# Patient Record
Sex: FEMALE | Race: OTHER | HISPANIC OR LATINO | Employment: UNEMPLOYED | URBAN - METROPOLITAN AREA
[De-identification: names, ages, dates, MRNs, and addresses within clinical notes are randomized per-mention and may not be internally consistent; named-entity substitution may affect disease eponyms.]

---

## 2024-08-08 ENCOUNTER — OFFICE VISIT (OUTPATIENT)
Dept: CARDIOLOGY | Facility: CLINIC | Age: 34
End: 2024-08-08

## 2024-08-08 VITALS
BODY MASS INDEX: 25.27 KG/M2 | SYSTOLIC BLOOD PRESSURE: 132 MMHG | HEIGHT: 67 IN | DIASTOLIC BLOOD PRESSURE: 80 MMHG | OXYGEN SATURATION: 99 % | HEART RATE: 64 BPM | WEIGHT: 161 LBS | RESPIRATION RATE: 18 BRPM

## 2024-08-08 DIAGNOSIS — R00.2 PALPITATIONS: ICD-10-CM

## 2024-08-08 DIAGNOSIS — I49.9 IRREGULAR HEART BEAT: Primary | ICD-10-CM

## 2024-08-08 PROCEDURE — 93005 ELECTROCARDIOGRAM TRACING: CPT | Mod: PBBFAC | Performed by: INTERNAL MEDICINE

## 2024-08-08 PROCEDURE — 99999 PR PBB SHADOW E&M-NEW PATIENT-LVL IV: CPT | Mod: PBBFAC,,, | Performed by: INTERNAL MEDICINE

## 2024-08-08 PROCEDURE — 99204 OFFICE O/P NEW MOD 45 MIN: CPT | Mod: PBBFAC | Performed by: INTERNAL MEDICINE

## 2024-08-09 LAB
OHS QRS DURATION: 94 MS
OHS QTC CALCULATION: 452 MS

## 2024-08-16 ENCOUNTER — HOSPITAL ENCOUNTER (OUTPATIENT)
Dept: PULMONOLOGY | Facility: HOSPITAL | Age: 34
Discharge: HOME | End: 2024-08-16
Attending: INTERNAL MEDICINE

## 2024-08-16 DIAGNOSIS — I49.9 IRREGULAR HEART BEAT: ICD-10-CM

## 2024-08-16 PROCEDURE — 93226 XTRNL ECG REC<48 HR SCAN A/R: CPT

## 2024-08-16 PROCEDURE — 93227 XTRNL ECG REC<48 HR R&I: CPT | Mod: ,,, | Performed by: INTERNAL MEDICINE

## 2024-08-22 ENCOUNTER — TELEPHONE (OUTPATIENT)
Dept: CARDIOLOGY | Facility: HOSPITAL | Age: 34
End: 2024-08-22

## 2024-08-22 LAB
OHS CV EVENT MONITOR DAY: 0
OHS CV HOLTER LENGTH DECIMAL HOURS: 48
OHS CV HOLTER LENGTH HOURS: 48
OHS CV HOLTER LENGTH MINUTES: 0
OHS CV HOLTER SINUS AVERAGE HR: 73
OHS CV HOLTER SINUS MAX HR: 138
OHS CV HOLTER SINUS MIN HR: 39

## 2024-08-22 NOTE — TELEPHONE ENCOUNTER
She was given reports of Holter over the telephone.  She is still feeling nocturnal palpitations, I gave reassurance and did not recommend medication.  Her echo showed normal ejection fraction.      She will make an appointment if she has further questions or have a friend call me for more information.

## 2025-05-07 ENCOUNTER — HOSPITAL ENCOUNTER (EMERGENCY)
Facility: HOSPITAL | Age: 35
Discharge: HOME | End: 2025-05-07
Attending: EMERGENCY MEDICINE

## 2025-05-07 VITALS
BODY MASS INDEX: 25.11 KG/M2 | WEIGHT: 160 LBS | RESPIRATION RATE: 18 BRPM | SYSTOLIC BLOOD PRESSURE: 119 MMHG | HEIGHT: 67 IN | DIASTOLIC BLOOD PRESSURE: 64 MMHG | HEART RATE: 69 BPM | OXYGEN SATURATION: 100 % | TEMPERATURE: 98 F

## 2025-05-07 DIAGNOSIS — Z75.8 DOES NOT HAVE PRIMARY CARE PROVIDER: ICD-10-CM

## 2025-05-07 DIAGNOSIS — B96.89 BACTERIAL VAGINOSIS: Primary | ICD-10-CM

## 2025-05-07 DIAGNOSIS — N76.0 BACTERIAL VAGINOSIS: Primary | ICD-10-CM

## 2025-05-07 LAB
ABSOLUTE EOSINOPHIL (OHS): 0.12 K/UL
ABSOLUTE MONOCYTE (OHS): 0.56 K/UL (ref 0.3–1)
ABSOLUTE NEUTROPHIL COUNT (OHS): 3.11 K/UL (ref 1.8–7.7)
ALBUMIN SERPL BCP-MCNC: 4.3 G/DL (ref 3.5–5.2)
ALP SERPL-CCNC: 82 UNIT/L (ref 38–126)
ALT SERPL W/O P-5'-P-CCNC: 17 UNIT/L (ref 10–44)
ANION GAP (OHS): 9 MMOL/L (ref 8–16)
AST SERPL-CCNC: 24 UNIT/L (ref 15–46)
B-HCG UR QL: NEGATIVE
BACTERIA GENITAL QL WET PREP: ABNORMAL
BASOPHILS # BLD AUTO: 0.04 K/UL
BASOPHILS NFR BLD AUTO: 0.6 %
BILIRUB SERPL-MCNC: 0.3 MG/DL (ref 0.1–1)
BILIRUB UR QL STRIP.AUTO: NEGATIVE
BUN SERPL-MCNC: 18 MG/DL (ref 7–17)
CALCIUM SERPL-MCNC: 9 MG/DL (ref 8.7–10.5)
CHLORIDE SERPL-SCNC: 102 MMOL/L (ref 95–110)
CLARITY UR: ABNORMAL
CLUE CELLS VAG QL WET PREP: ABNORMAL
CO2 SERPL-SCNC: 28 MMOL/L (ref 23–29)
COLOR UR AUTO: YELLOW
CREAT SERPL-MCNC: 0.7 MG/DL (ref 0.5–1.4)
CTP QC/QA: YES
ERYTHROCYTE [DISTWIDTH] IN BLOOD BY AUTOMATED COUNT: 12 % (ref 11.5–14.5)
FILAMENT FUNGI VAG WET PREP-#/AREA: ABNORMAL
GFR SERPLBLD CREATININE-BSD FMLA CKD-EPI: >60 ML/MIN/1.73/M2
GLUCOSE SERPL-MCNC: 81 MG/DL (ref 70–110)
GLUCOSE UR QL STRIP: NEGATIVE
HCT VFR BLD AUTO: 38.9 % (ref 37–48.5)
HGB BLD-MCNC: 13.3 GM/DL (ref 12–16)
HGB UR QL STRIP: NEGATIVE
HOLD SPECIMEN: NORMAL
IMM GRANULOCYTES # BLD AUTO: 0.02 K/UL (ref 0–0.04)
IMM GRANULOCYTES NFR BLD AUTO: 0.3 % (ref 0–0.5)
KETONES UR QL STRIP: NEGATIVE
LEUKOCYTE ESTERASE UR QL STRIP: NEGATIVE
LIPASE SERPL-CCNC: 83 U/L (ref 23–300)
LYMPHOCYTES # BLD AUTO: 3.05 K/UL (ref 1–4.8)
MCH RBC QN AUTO: 32.8 PG (ref 27–31)
MCHC RBC AUTO-ENTMCNC: 34.2 G/DL (ref 32–36)
MCV RBC AUTO: 96 FL (ref 82–98)
NITRITE UR QL STRIP: NEGATIVE
NUCLEATED RBC (/100WBC) (OHS): 0 /100 WBC
PH UR STRIP: 6 [PH]
PLATELET # BLD AUTO: 234 K/UL (ref 150–450)
PMV BLD AUTO: 10.8 FL (ref 9.2–12.9)
POTASSIUM SERPL-SCNC: 3.6 MMOL/L (ref 3.5–5.1)
PROT SERPL-MCNC: 7.6 GM/DL (ref 6–8.4)
PROT UR QL STRIP: NEGATIVE
RBC # BLD AUTO: 4.05 M/UL (ref 4–5.4)
RELATIVE EOSINOPHIL (OHS): 1.7 %
RELATIVE LYMPHOCYTE (OHS): 44.2 % (ref 18–48)
RELATIVE MONOCYTE (OHS): 8.1 % (ref 4–15)
RELATIVE NEUTROPHIL (OHS): 45.1 % (ref 38–73)
SODIUM SERPL-SCNC: 139 MMOL/L (ref 136–145)
SP GR UR STRIP: <=1.005
T VAGINALIS GENITAL QL WET PREP: ABNORMAL
UROBILINOGEN UR STRIP-ACNC: NEGATIVE EU/DL
WBC # BLD AUTO: 6.9 K/UL (ref 3.9–12.7)
WBC #/AREA VAG WET PREP: ABNORMAL
YEAST GENITAL QL WET PREP: ABNORMAL

## 2025-05-07 PROCEDURE — 83690 ASSAY OF LIPASE: CPT | Mod: ER

## 2025-05-07 PROCEDURE — 84075 ASSAY ALKALINE PHOSPHATASE: CPT | Mod: ER

## 2025-05-07 PROCEDURE — 25500020 PHARM REV CODE 255: Mod: ER

## 2025-05-07 PROCEDURE — 81003 URINALYSIS AUTO W/O SCOPE: CPT | Mod: ER

## 2025-05-07 PROCEDURE — 87210 SMEAR WET MOUNT SALINE/INK: CPT | Mod: ER

## 2025-05-07 PROCEDURE — 85025 COMPLETE CBC W/AUTO DIFF WBC: CPT | Mod: ER

## 2025-05-07 PROCEDURE — 81025 URINE PREGNANCY TEST: CPT | Mod: ER

## 2025-05-07 PROCEDURE — 99285 EMERGENCY DEPT VISIT HI MDM: CPT | Mod: 25,ER

## 2025-05-07 RX ORDER — METRONIDAZOLE 500 MG/1
500 TABLET ORAL EVERY 12 HOURS
Qty: 14 TABLET | Refills: 0 | Status: SHIPPED | OUTPATIENT
Start: 2025-05-07 | End: 2025-05-07

## 2025-05-07 RX ORDER — METRONIDAZOLE 500 MG/1
500 TABLET ORAL EVERY 12 HOURS
Qty: 14 TABLET | Refills: 0 | Status: SHIPPED | OUTPATIENT
Start: 2025-05-07 | End: 2025-05-14

## 2025-05-07 RX ADMIN — IOHEXOL 100 ML: 350 INJECTION, SOLUTION INTRAVENOUS at 03:05

## 2025-05-07 NOTE — DISCHARGE INSTRUCTIONS
Thank you for letting me care for you today - it was nice to meet you and I hope you feel better soon. I have placed a referral to [facility] for Primary Care. You can call [phone] to schedule.     I sent in the following medication to your pharmacy:   Metronidazole (flagyl): 1 tablet every 12 hours for 7 days. Please note that this medication can give you a metallic taste in your mouth. Also, DO NOT drink alcohol while taking this medication.      Our goal at [facility] is to always give you outstanding care and exceptional service. You may receive a survey by mail or email in the next week about your experience in our ED. We would greatly appreciate you completing and returning the survey. Your feedback provides us with a way to recognize our staff who give very good care and it helps us learn how to improve when your experience was below our aspiration of excellence.     All the best,     Yasmine Soot, MPH, PA-C  Emergency Department Physician Assistant  [facility], [facility], [facility]

## 2025-05-10 NOTE — ED PROVIDER NOTES
"Encounter Date: 5/7/2025       History     Chief Complaint   Patient presents with    Abdominal Pain     Left side ABD and back pain that started this morning and has become more severe.      Patient is a 34 y.o. female who presents to the ED for evaluation of left lower abdominal pain X several months. States that the pain became worse over the last few days. Patient says she has seen by primary care yesterday who recommended patient have a "scan", which patient has not been able to schedule. Says that the pain got worse this morning so she presents to the ED for evaluation.     Patient has taken no medication for symptom relief. Patient denies any previous abdominal surgeries.     Patient denies fever, chills. Patient denies nausea, vomiting. States his last bowel movement was yesterday, and patient reports it was normal. Patient  denies diarrhea, constipation. Patient denies BRBPR, denies melena, or changes in bowel habits. Patient  denies dysuria, difficulty urinating, hematuria, flank pain. Endorses slight increase in vaginal discharge, denies concern about STDs. Denies chest pain, shortness of breath, rashes, or any other complaints at this time.      The history is provided by the patient. The history is limited by a language barrier. A  was used ([place]  used for all patient interactions).     Review of patient's allergies indicates:  No Known Allergies  Past Medical History:   Diagnosis Date    Hypothyroidism      History reviewed. No pertinent surgical history.  No family history on file.  Social History[1]  Review of Systems   Constitutional:  Negative for fever.   HENT:  Negative for sore throat.    Respiratory:  Negative for shortness of breath.    Cardiovascular:  Negative for chest pain.   Gastrointestinal:  Positive for abdominal pain. Negative for abdominal distention, anal bleeding, blood in stool, constipation, diarrhea, nausea, rectal pain and vomiting. "   Genitourinary:  Positive for vaginal discharge. Negative for difficulty urinating, dysuria, flank pain, frequency, hematuria, pelvic pain, urgency and vaginal bleeding.   Musculoskeletal:  Negative for back pain.   Skin:  Negative for rash.   Neurological:  Negative for weakness.   Hematological:  Does not bruise/bleed easily.       Physical Exam     Initial Vitals [05/07/25 1331]   BP Pulse Resp Temp SpO2   120/75 71 18 98.2 °F (36.8 °C) 100 %      MAP       --         Physical Exam    Constitutional: She appears well-developed and well-nourished.   HENT:   Head: Normocephalic and atraumatic.   Cardiovascular:  Normal rate.           Abdominal: Abdomen is soft. Bowel sounds are normal. She exhibits no distension and no mass. There is abdominal tenderness. There is no rebound and no guarding.     Neurological: She is alert.         ED Course   Procedures  Labs Reviewed   WET PREP, GENITAL - Abnormal       Result Value    WBC None      Bacteria Moderate (*)     Clue Cells Few (*)     TRICHOMONAS  None      BUDDING YEAST None      FUNGAL HYPHAE None     COMPREHENSIVE METABOLIC PANEL - Abnormal    Sodium 139      Potassium 3.6      Chloride 102      CO2 28      Glucose 81      BUN 18 (*)     Creatinine 0.7      Calcium 9.0      Protein Total 7.6      Albumin 4.3      Bilirubin Total 0.3      ALP 82      AST 24      ALT 17      Anion Gap 9      eGFR >60     URINALYSIS, REFLEX TO URINE CULTURE - Abnormal    Color, UA Yellow      Appearance, UA Hazy (*)     pH, UA 6.0      Spec Grav UA <=1.005 (*)     Protein, UA Negative      Glucose, UA Negative      Ketones, UA Negative      Bilirubin, UA Negative      Blood, UA Negative      Nitrites, UA Negative      Urobilinogen, UA Negative      Leukocyte Esterase, UA Negative     CBC WITH DIFFERENTIAL - Abnormal    WBC 6.90      RBC 4.05      HGB 13.3      HCT 38.9      MCV 96      MCH 32.8 (*)     MCHC 34.2      RDW 12.0      Platelet Count 234      MPV 10.8      Nucleated RBC  0      Neut % 45.1      Lymph % 44.2      Mono % 8.1      Eos % 1.7      Basophil % 0.6      Imm Grans % 0.3      Neut # 3.11      Lymph # 3.05      Mono # 0.56      Eos # 0.12      Baso # 0.04      Imm Grans # 0.02     LIPASE - Normal    Lipase Level 83     CBC W/ AUTO DIFFERENTIAL    Narrative:     The following orders were created for panel order CBC auto differential.  Procedure                               Abnormality         Status                     ---------                               -----------         ------                     CBC with Differential[[id number]]         Abnormal            Final result                 Please view results for these tests on the individual orders.   GREY TOP URINE HOLD    Extra Tube Hold for add-ons.     POCT URINE PREGNANCY    POC Preg Test, Ur Negative       Acceptable Yes            Imaging Results              CT Abdomen Pelvis With IV Contrast NO Oral Contrast (Final result)  Result time 05/07/25 16:03:10      Final result by Edgard Stapleton MD (05/07/25 16:03:10)                   Impression:     No acute findings are identified.    All CT scans at [this location] are performed using dose modulation techniques as appropriate to a performed exam including the following:  Automated exposure control; adjustment of the mA and/or kV according to patient size (this includes techniques or standardized protocols for targeted exams where dose is matched to indication / reason for exam; i.e. extremities or head); use of iterative reconstruction technique.    Finalized on: 5/7/2025 4:03 PM By:  Edgard Stapleton MD  [facility]# [id number]      2025-05-07 16:05:12.096     [facility]               Narrative:    EXAM: CT ABDOMEN PELVIS WITH IV CONTRAST    CLINICAL INDICATION: Generalized abdominal pain.    TECHNIQUE: Postcontrast CT scan performed through the abdomen and pelvis.    COMPARISON: None    FINDINGS:  The lung bases are clear.  The liver is normal.  The gallbladder appears  normal.   The spleen is normal.  No pancreatic abnormality is identified.  The adrenal glands are normal.  The kidneys are unremarkable.  The aorta is nondilated.  No soft tissue masses, adenopathy, or free fluid identified.  No acute bowel abnormality is appreciated. No bowel dilitation. No evidence of appendicitis.  The bladder is unremarkable.  No acute osseous abnormalities are identified.                                         Medications   iohexoL (OMNIPAQUE 350) injection 100 mL (100 mLs Intravenous Given 5/7/25 1525)     Medical Decision Making  Patient is a afebrile, nontoxic appearing 34 y.o. female. Abdominal pain is localized to the left lower quadrant. There is not guarding, rebound, distension, rigidity, or decreased bowel sounds. There is not CVA tenderness. Denies chest pain and shortness of breath, rashes.  VSS and not suggestive of sepsis. Tolerating PO. The patient remained comfortable and stable during their visit in the ED. Details of ED course documented in ED workup.     Differential diagnosis includes, but is not limited to:  Gastroenteritis, diverticulitis, diverticulosis, bowel obstruction, appendicitis, cholangitis, cholelithiasis, cholecystitis, choledocholithiasis, pancreatitis,  ovarian cyst, ovarian torsion, cystitis, UTI, pyelonephritis, nephrolithiasis, BV, gonorrhea, chlamydia, trichomonas, candidiasis, PID    All historical, clinical, radiographic, and laboratory findings reviewed. There is no significant focal abdominal tenderness to suggest cholecystitis, appendicitis, diverticulitis, or  source. There is no rebound/guarding/rigidity/distension or other peritoneal signs to suggest perforation or other emergent surgical process.  CT without any acute/emergent findings. Patient is afebrile, CBC without leukocytosis or other acute abnormalities to suggest acute bacterial infection. CMP unremarkable. Lipase is WNL. UA is not suggestive of UTI. Vaginal screen suggestive of BV, will  treat with flagyl.     There are no concerning features on physical exam to suggest an emergent or life threatening condition. No further intervention is indicated at this time after having taken into account the patient's history, physical exam findings, and empirical and objective data obtained during the patient's emergency department workup. The patient is at low risk for an emergent/life threatening medical condition at this time, and I am of the belief that that it is safe to discharge the patient from the emergency department.     I have discussed the specifics of the workup with the patient and the patient has verbalized understanding of the details of the workup, the diagnosis, the treatment plan, and the need for outpatient follow-up. Although the patient has no emergent etiology today this does not preclude the development of an emergent condition so, in addition, I have advised the patient that they can return to the ED and/or activate EMS at any time with worsening of their symptoms, change of their symptoms, or with any other medical complaint. Additionally, patient instructed to follow up with PCP in 2-3 days for recheck of today's complaints.    All patient questions answered. Patient given discharge instructions. Discharge and follow-up instructions discussed with the patient who expressed understanding and willingness to comply with recommendations. Patient discharged from the emergency department in stable condition, in no acute distress.     Amount and/or Complexity of Data Reviewed  Labs: ordered. Decision-making details documented in ED Course.  Radiology: ordered and independent interpretation performed. Decision-making details documented in ED Course.    Risk  Prescription drug management.               ED Course as of 05/10/25 1111   Wed May 07, 2025   1432 hCG Qualitative, Urine: Negative [OB]   1452 Clue Cells, Wet Prep(!): Few  Consistent with BV. Will treat with flagyl.  [OB]   1523 WBC:  6.90  No leukocytosis [OB]   1523 Hemoglobin: 13.3 [OB]   1523 Hematocrit: 38.9  H&H stable [OB]   1523 Leukocyte Esterase, UA: Negative [OB]   1523 NITRITE UA: Negative  UA not consistent with a UTI [OB]   1607 CT abdomen pelvis reviewed: No acute abnormalities noted. [OB]      ED Course User Index  [OB] Yasmine Soto PA-C                           Clinical Impression:  Final diagnoses:  [N76.0, B96.89] Bacterial vaginosis (Primary)  [Z75.8] Does not have primary care provider          ED Disposition Condition    Discharge Stable          ED Prescriptions       Medication Sig Dispense Start Date End Date Auth. Provider    metroNIDAZOLE (FLAGYL) 500 MG tablet  (Status: Discontinued) Take 1 tablet (500 mg total) by mouth every 12 (twelve) hours. for 7 days 14 tablet 5/7/2025 5/7/2025 Yasmine Soto PA-C    metroNIDAZOLE (FLAGYL) 500 MG tablet Take 1 tablet (500 mg total) by mouth every 12 (twelve) hours. for 7 days 14 tablet 5/7/2025 5/14/2025 Yasmine Soto PA-C          Follow-up Information       Follow up With Specialties Details Why Contact Info Additional Information    [facility] Schedule an appointment as soon as possible for a visit   [address]  [address] [address] [phone] Please park in [address] or [address] and use [address]. Take Medical Office Bldg. elevators.               [1]   Social History  Tobacco Use    Smoking status: Never   Substance Use Topics    Alcohol use: No    Drug use: No        Yasmine Soto PA-C  05/10/25 1111